# Patient Record
Sex: FEMALE | Race: BLACK OR AFRICAN AMERICAN | HISPANIC OR LATINO | Employment: UNEMPLOYED | RURAL
[De-identification: names, ages, dates, MRNs, and addresses within clinical notes are randomized per-mention and may not be internally consistent; named-entity substitution may affect disease eponyms.]

---

## 2023-09-07 ENCOUNTER — OFFICE VISIT (OUTPATIENT)
Dept: FAMILY MEDICINE CLINIC | Facility: CLINIC | Age: 23
End: 2023-09-07
Payer: OTHER GOVERNMENT

## 2023-09-07 VITALS
SYSTOLIC BLOOD PRESSURE: 110 MMHG | DIASTOLIC BLOOD PRESSURE: 68 MMHG | HEART RATE: 96 BPM | TEMPERATURE: 97.8 F | OXYGEN SATURATION: 99 % | HEIGHT: 65 IN | WEIGHT: 166 LBS | BODY MASS INDEX: 27.66 KG/M2

## 2023-09-07 DIAGNOSIS — Z30.09 FAMILY PLANNING: Primary | ICD-10-CM

## 2023-09-07 DIAGNOSIS — Z13.29 SCREENING FOR THYROID DISORDER: ICD-10-CM

## 2023-09-07 DIAGNOSIS — Z12.4 SCREENING FOR CERVICAL CANCER: ICD-10-CM

## 2023-09-07 DIAGNOSIS — Z13.220 SCREENING FOR HYPERLIPIDEMIA: ICD-10-CM

## 2023-09-07 DIAGNOSIS — Z11.59 NEED FOR HEPATITIS C SCREENING TEST: ICD-10-CM

## 2023-09-07 DIAGNOSIS — Z13.6 SCREENING FOR CARDIOVASCULAR CONDITION: ICD-10-CM

## 2023-09-07 DIAGNOSIS — Z13.1 SCREENING FOR DIABETES MELLITUS: ICD-10-CM

## 2023-09-07 DIAGNOSIS — Z76.89 ENCOUNTER TO ESTABLISH CARE: ICD-10-CM

## 2023-09-07 DIAGNOSIS — Z13.21 ENCOUNTER FOR VITAMIN DEFICIENCY SCREENING: ICD-10-CM

## 2023-09-07 DIAGNOSIS — Z00.00 ANNUAL PHYSICAL EXAM: ICD-10-CM

## 2023-09-07 NOTE — PATIENT INSTRUCTIONS
Fasting labs (Nothing to eat or drink except for water for 8 hours prior to lab draw) at earliest convenience. Lab is open from 8 am - 5 pm Monday-Thursday & Friday 8 am - 4 pm (closed on major holidays).     Referral to OB for Pap and discuss IUD placement

## 2023-09-07 NOTE — PROGRESS NOTES
Chief Complaint  Establish Care discussed family-planning    Subjective          Katherine Salazar presents to [facility] [place]    History reviewed. No pertinent past medical history.   Past Surgical History:   Procedure Laterality Date    KNEE SURGERY      WRIST FUSION          No current outpatient medications   No Known Allergies     History of Present Illness  Katherine Salazar, 23-year-old female, presents to office to establish care.  Patient reports no recent PCP.  Has [id number] son, only child.  Patient reports OB/GYN care through [facility].  Patient is not on birth control at this time, does not desire pregnancy.  Uses other forms of birth control.  Patient is interested in nonhormonal birth control.  Patient reports no recent Pap smear, states has never had.  Does report birth control pills have previously caused elevated blood pressure.  During pregnancy patient did have gestational hypertension in which she was treated with low-dose aspirin per patient report.  Otherwise no known past medical history.      Family history does include heart disease: Grandmother.The following portions of the patient's history were reviewed and updated as appropriate: allergies, current medications, past family history, past medical history, past social history, past surgical history and problem list.     Review of Systems   Constitutional: Negative.  Negative for chills and fever.   HENT: Negative.     Eyes: Negative.    Respiratory: Negative.  Negative for shortness of breath.    Cardiovascular: Negative.  Negative for chest pain, palpitations and leg swelling.   Gastrointestinal: Negative.  Negative for constipation and diarrhea.   Endocrine: Negative.    Musculoskeletal: Negative.    Skin: Negative.    Neurological: Negative.    Psychiatric/Behavioral: Negative.        Objective   Vital Signs:   /68 (BP Location: Right arm, Patient Position:  "Sitting, Cuff Size: Adult)   Pulse 96   Temp 97.8 °F (36.6 °C)   Ht 165.1 cm (65\")   Wt 75.3 kg (166 lb)   SpO2 99%   BMI 27.62 kg/m²       Physical Exam  Vitals reviewed.   Constitutional:       General: She is not in acute distress.     Appearance: Normal appearance. She is not ill-appearing.      Comments: Body mass index is 27.62 kg/m².     HENT:      Head: Normocephalic and atraumatic.      Right Ear: Tympanic membrane, ear canal and external ear normal.      Left Ear: Tympanic membrane, ear canal and external ear normal.      Nose: Nose normal.      Mouth/Throat:      Mouth: Mucous membranes are moist.      Pharynx: Oropharynx is clear.   Eyes:      General:         Right eye: No discharge.         Left eye: No discharge.   Cardiovascular:      Rate and Rhythm: Normal rate and regular rhythm.      Heart sounds: Normal heart sounds.   Pulmonary:      Effort: Pulmonary effort is normal. No respiratory distress.      Breath sounds: Normal breath sounds. No stridor. No wheezing, rhonchi or rales.   Chest:      Chest wall: No tenderness.   Abdominal:      General: Abdomen is flat. Bowel sounds are normal.      Palpations: Abdomen is soft.      Tenderness: There is no abdominal tenderness.   Musculoskeletal:         General: Normal range of motion.      Cervical back: Normal range of motion and neck supple. No tenderness.   Lymphadenopathy:      Cervical: No cervical adenopathy.   Neurological:      General: No focal deficit present.      Mental Status: She is alert and oriented to person, place, and time. Mental status is at baseline.      Gait: Gait normal.   Psychiatric:         Mood and Affect: Mood normal.         Behavior: Behavior normal.         Thought Content: Thought content normal.         Judgment: Judgment normal.        Result Review :   The following data was reviewed by: OLIVER Ariza on 09/07/2023:    No routine labs available for review at this time.         Assessment and Plan  "   Diagnoses and all orders for this visit:    1. Family planning (Primary)  -     Ambulatory Referral to Obstetrics / Gynecology; Future    2. Screening for cervical cancer  -     Ambulatory Referral to Obstetrics / Gynecology; Future    3. Screening for cardiovascular condition  -     CBC & Differential; Future  -     Comprehensive Metabolic Panel; Future    4. Screening for thyroid disorder  -     TSH Rfx On Abnormal To Free T4; Future    5. Screening for diabetes mellitus  -     Hemoglobin A1c; Future    6. Screening for hyperlipidemia  -     Lipid Panel; Future    7. Need for hepatitis C screening test  -     HCV Antibody Rfx To Qnt PCR; Future    8. Encounter for vitamin deficiency screening  -     Vitamin D,25-Hydroxy; Future    9. Encounter to establish care    10. Annual physical exam      -Annual exam/establish care.  Patient is on no medications.  -OB/GYN referral for Pap and possible IUD placement.  -Declines OCP for birth control until IUD placement.  Will plan to use barrier birth control.  -Routine fasting labs ordered.  -Follow-up in 1 year for annual exam.    -Discussed plan of care with patient.    -Answered all of patient's questions.  -Patient agreeable to plan of care.    EMR Dragon/Transcription Disclaimer: Some of this note may be an electronic transcription/translation of spoken language to printed text.  The electronic translation of spoken language may permit erroneous, or at times, nonsensical words or phrases to be inadvertently transcribed. Although I have reviewed the note for such errors, some may still exist.       Follow Up   Return in about 1 year (around 9/7/2024) for Annual physical.  Patient was given instructions and counseling regarding her condition or for health maintenance advice. Please see specific information pulled into the AVS if appropriate.       This document has been electronically signed by OLIVER Ariza on September 8, 2023 05:18 CDT,.